# Patient Record
Sex: FEMALE | Race: BLACK OR AFRICAN AMERICAN | NOT HISPANIC OR LATINO | URBAN - METROPOLITAN AREA
[De-identification: names, ages, dates, MRNs, and addresses within clinical notes are randomized per-mention and may not be internally consistent; named-entity substitution may affect disease eponyms.]

---

## 2022-05-10 ENCOUNTER — EMERGENCY (EMERGENCY)
Facility: HOSPITAL | Age: 57
LOS: 1 days | Discharge: HOME | End: 2022-05-10
Attending: EMERGENCY MEDICINE | Admitting: EMERGENCY MEDICINE
Payer: MEDICAID

## 2022-05-10 VITALS
SYSTOLIC BLOOD PRESSURE: 152 MMHG | DIASTOLIC BLOOD PRESSURE: 100 MMHG | TEMPERATURE: 98 F | RESPIRATION RATE: 14 BRPM | HEART RATE: 106 BPM | OXYGEN SATURATION: 100 %

## 2022-05-10 VITALS
RESPIRATION RATE: 16 BRPM | HEART RATE: 100 BPM | TEMPERATURE: 98 F | DIASTOLIC BLOOD PRESSURE: 84 MMHG | SYSTOLIC BLOOD PRESSURE: 166 MMHG | OXYGEN SATURATION: 100 %

## 2022-05-10 PROCEDURE — 99284 EMERGENCY DEPT VISIT MOD MDM: CPT

## 2022-05-10 RX ORDER — FAMOTIDINE 10 MG/ML
20 INJECTION INTRAVENOUS ONCE
Refills: 0 | Status: COMPLETED | OUTPATIENT
Start: 2022-05-10 | End: 2022-05-10

## 2022-05-10 RX ADMIN — FAMOTIDINE 20 MILLIGRAM(S): 10 INJECTION INTRAVENOUS at 09:42

## 2022-05-10 NOTE — ED PROVIDER NOTE - PHYSICAL EXAMINATION
General appearance: NAD, conversant, afebrile    Eyes: mild bl eyelid swelling, anicteric sclerae, CASPER, EOMI   HENT: Atraumatic; oropharynx clear, MMM and no ulcerations, no pharyngeal erythema or exudate   Neck: Trachea midline; Full range of motion, supple   Pulm: CTA bl, normal respiratory effort and no intercostal retractions, normal work of breathing   CV: RRR, No murmurs, rubs, or gallops.    Abdomen: Soft, non-tender, non-distended; no guarding or rebound   Extremities: No peripheral edema or extremity lymphadenopathy. 5/5 strength in all four extremities.   Skin: 4x3cm L forehead darker discoloration. 2x2cm R cheek erythematous rash w/ darker center.    Psych: Appropriate affect, cooperative; alert and oriented to person, place and time

## 2022-05-10 NOTE — ED PROVIDER NOTE - PATIENT PORTAL LINK FT
You can access the FollowMyHealth Patient Portal offered by [facility] by registering at the following website: [email or web address]. By joining [email or web address]’s FollowMyHealth portal, you will also be able to view your health information using other applications (apps) compatible with our system.

## 2022-05-10 NOTE — ED PROVIDER NOTE - OBJECTIVE STATEMENT
55yo F w/ no pmh presenting with eyelid swelling. For 3 weeks pt has been getting intermittent eyelid swelling that resolves with cold compresses. She also had a rash on L forehead that was itchy, oozed a bit and now resolved, with some remaining discoloration. Now she has a small round rash on R cheek that is itchy. This morning she woke up and could not open eyes due to swelling, resolved with cold compresses. The rashes are not painful. No pain in eyes or vision changes. No fever, allergies, new lotions/soaps/hair products. No cp, sob, n/v/d, abd pain. Pt drove today.

## 2022-05-10 NOTE — ED PROVIDER NOTE - ATTENDING CONTRIBUTION TO CARE
Pt was seen and evaluated by me. Pt is a 55 y/o female with so significant PMHx who presented to the ED with eyelid swelling X 1 day. Pt states 3 weeks ago having a URI and noted since having some swelling to b/l eyelids. Pt notes she has applied ice to the area with improved the swelling. Pt admits the area is itchy. Pt denies any visual changes. Pt denies any discharge from eye. Pt denies any fever, chills, nausea, vomiting, SOB, difficulty swalling, chest pain, or abd pain. Pt also notes having a rash to forehead earlier that was itchy but has resolved.   VITALS: Vitals have been reviewed.  GEN APPEARANCE: WDWN, alert and cooperative, non-toxic appearing and in NAD  HEAD: Atraumatic, normocephalic.   EYES: PERRL, EOMI. No erythema or discharge.   EARS: Gross hearing intact.   NOSE: No nasal discharge.   THROAT: MMM. Oral cavity and pharynx normal. Uvula midline. No swelling. No exudate.    NECK: Supple, no lymphadenopathy  CV: RRR, S1S2, no c/r/m/g. No cyanosis or pallor. Extremities warm, well perfused. Cap refill <2 seconds. No bruits.   LUNGS: CTAB. No wheezing. No rales. No rhonchi. No diminished breath sounds.   ABDOMEN: Soft, NTND. No guarding or rebound.   MSK/EXT: Spine appears normal, no spine point tenderness. No CVA ttp. Normal muscular development. PELVIS: Stable. No obvious joint or bony deformity, no peripheral edema.   NEURO: Alert, follows commands. Speech normal. Sensation and motor normal x4 extremities.   SKIN: Periorbital swelling. Noted to have macular rash to right maxilla area, blanching  PSYCH: Normal mood and affect.   Concern for allergic reaction  Pepcid, Recommend Allergist f/u

## 2022-05-10 NOTE — ED PROVIDER NOTE - NSFOLLOWUPINSTRUCTIONS_ED_ALL_ED_FT
YOU WERE SEEN FOR PERIORBITAL SWELLING    YOU HAVE BEEN DIAGNOSED WITH ALLERGIC REACTIN     TAKE TYLENOL 650mg EVERY 4 HOURS AS NEEDED FOR MILD PAIN  TAKE MOTRIN 600mg EVERY 6 HOURS AS NEEDED FOR MODERATE PAIN    FOLLOW UP WITH YOUR PRIMARY CARE PROVIDER WITHIN 1 WEEK.    RETURN TO THE EMERGENCY DEPARTMENT FOR ______  OR FOR ANY WORSENING SYMPTOMS. YOU WERE SEEN FOR PERIORBITAL SWELLING    YOU HAVE BEEN DIAGNOSED WITH ALLERGIC REACTION    MAY TAKE CLARITIN OR ZYRTEC OVER THE COUNTER AS DIRECTED.  MAY APPLY BENADRYL CREAM TO THE AFFECTED AREA.  MAY TAKE BENADRYL 25mg EVERY 6 HOURS AS NEEDED FOR ITCHINESS.    FOLLOW UP WITH ALLERGIST AS DISCUSSED.    FOLLOW UP WITH YOUR PRIMARY CARE PROVIDER WITHIN 1 WEEK.    RETURN TO THE EMERGENCY DEPARTMENT FOR DIFFICULTY SWELLING OR FOR ANY WORSENING SYMPTOMS.

## 2022-05-10 NOTE — ED PROVIDER NOTE - PROGRESS NOTE DETAILS
Dr. Huang: Pt notes swelling is better. Pt is driving so advised Benadryl when home. Advised f/u with Allergist as discussed.

## 2022-05-10 NOTE — ED PROVIDER NOTE - CLINICAL SUMMARY MEDICAL DECISION MAKING FREE TEXT BOX
55yo F w/ no pmh presenting with intermittent eyelid swelling, L forehead and R cheek rash for 3w. Rash does not appear like contact dermatitis or cellulitis. Likely allergic. Will give pepcid, advise to take benadryl at home as she drove today.

## 2022-05-10 NOTE — ED PROVIDER NOTE - SKIN, MLM
Skin normal color for race, warm, dry and intact. B/l periorbital swelling. Rash noted to right maxilla area, macula, erythematous, blanching,

## 2022-05-20 ENCOUNTER — APPOINTMENT (OUTPATIENT)
Dept: DERMATOLOGY | Facility: CLINIC | Age: 57
End: 2022-05-20
Payer: MEDICAID

## 2022-05-20 VITALS — BODY MASS INDEX: 24.33 KG/M2 | HEIGHT: 67 IN | WEIGHT: 155 LBS

## 2022-05-20 DIAGNOSIS — L21.9 SEBORRHEIC DERMATITIS, UNSPECIFIED: ICD-10-CM

## 2022-05-20 DIAGNOSIS — R21 RASH AND OTHER NONSPECIFIC SKIN ERUPTION: ICD-10-CM

## 2022-05-20 PROCEDURE — 99204 OFFICE O/P NEW MOD 45 MIN: CPT

## 2022-05-20 NOTE — PHYSICAL EXAM
[FreeTextEntry3] : AAOx3, pleasant, NAD, no visual lymphadenopathy\par hair, scalp, face, nose, eyelids, ears, lips, oropharynx, neck, chest, abdomen, back, right arm, left arm, nails, and hands examined with all normal findings,\par pertinent findings include:\par \par flaking on forehead\par erythema on eyes

## 2022-05-20 NOTE — HISTORY OF PRESENT ILLNESS
[FreeTextEntry1] : rash on face [de-identified] : 56 year old female here with rash on face. itchy. had swelling around eyes. started after having the flu. happening for ~6 weeks.

## 2022-05-20 NOTE — ASSESSMENT
[FreeTextEntry1] : seb derm\par mix keto with mometasone BID x1-2 weeks; SED\par inflammation triggered\par \par discussed claritin 10 mg PO TID x 2 weeks; SED

## 2022-05-23 RX ORDER — KETOCONAZOLE 20 MG/G
2 CREAM TOPICAL
Qty: 1 | Refills: 2 | Status: ACTIVE | COMMUNITY
Start: 2022-05-20 | End: 1900-01-01

## 2022-05-23 RX ORDER — MOMETASONE FUROATE 1 MG/G
0.1 CREAM TOPICAL
Qty: 1 | Refills: 5 | Status: ACTIVE | COMMUNITY
Start: 2022-05-20 | End: 1900-01-01

## 2022-09-27 NOTE — ED PROVIDER NOTE - RESPIRATORY, MLM
Spoke with pt, he said he wants a referral to see a Urologist for the catheter issue. He wants it removed, advised pt he had an appt in Oct with Urology. Pt said yeah but it is with a NP and I want to see a doctor. Advised NP works directly with a MD and that everything is approved by MD. He said but I have a consultation and I don't want a consultation I just want to the catheter removed. Advised pt usually needs consultation before they are just going to take the catheter out. Pt said I just want to see a doctor please. RN placed another referral, accidentally signed it. Is PCP okay with this?  I'm just going to give the pt the specialty scheduling number and tell him to let the  know he wants to see an MD and not a NP but that he most likely is going to have to have consult before they do anything.   Breath sounds clear and equal bilaterally.